# Patient Record
Sex: FEMALE
[De-identification: names, ages, dates, MRNs, and addresses within clinical notes are randomized per-mention and may not be internally consistent; named-entity substitution may affect disease eponyms.]

---

## 2018-06-22 ENCOUNTER — HOSPITAL ENCOUNTER (OUTPATIENT)
Dept: HOSPITAL 88 - OR | Age: 28
Discharge: HOME | End: 2018-06-22
Attending: INTERNAL MEDICINE
Payer: COMMERCIAL

## 2018-06-22 DIAGNOSIS — K51.50: ICD-10-CM

## 2018-06-22 DIAGNOSIS — K20.9: ICD-10-CM

## 2018-06-22 DIAGNOSIS — K92.0: Primary | ICD-10-CM

## 2018-06-22 DIAGNOSIS — K44.9: ICD-10-CM

## 2018-06-22 DIAGNOSIS — K29.70: ICD-10-CM

## 2018-06-22 LAB — LACTOFERRIN STL QL: NEGATIVE

## 2018-06-22 PROCEDURE — 87493 C DIFF AMPLIFIED PROBE: CPT

## 2018-06-22 PROCEDURE — 87328 CRYPTOSPORIDIUM AG IA: CPT

## 2018-06-22 PROCEDURE — 83993 ASSAY FOR CALPROTECTIN FECAL: CPT

## 2018-06-22 PROCEDURE — 81025 URINE PREGNANCY TEST: CPT

## 2018-06-22 PROCEDURE — 87045 FECES CULTURE AEROBIC BACT: CPT

## 2018-06-22 PROCEDURE — 87177 OVA AND PARASITES SMEARS: CPT

## 2018-06-22 PROCEDURE — 43239 EGD BIOPSY SINGLE/MULTIPLE: CPT

## 2018-06-22 PROCEDURE — 83630 LACTOFERRIN FECAL (QUAL): CPT

## 2018-06-22 PROCEDURE — 45380 COLONOSCOPY AND BIOPSY: CPT

## 2018-06-22 NOTE — OPERATIVE REPORT
DATE OF PROCEDURE:  June 22, 2018 



REFERRING PHYSICIAN:  Dr. Jean Claude James 



PROCEDURE PERFORMED:  Esophagogastroduodenoscopy with biopsies and a 

colonoscopy with biopsies.



INDICATIONS FOR ESOPHAGOGASTRODUODENOSCOPY:  Upper abdominal pain, 

bloating, history of coffee-ground emesis.



INDICATIONS FOR COLONOSCOPY:  Bloody stools.



MEDICATION:  Patient was done under MAC.  Please see anesthesiologist's 

note.



PROCEDURE:  With patient in left lateral decubitus position, flexible 

fiberoptic Olympus gastroscope was introduced into the esophagus under 

direct visualization without any difficulty.  There was some patchy 

erythema noted in distal esophagus.  The scope was then advanced with ease 

into the stomach traversing a small sliding hiatal hernia.  Mucosa 

overlying the antrum and the body revealed some diffuse erythema and 

low-grade to moderate edema and biopsies were obtained and sent to stain 

for H. pylori.  The pylorus was of normal contour and shape, was intubated 

with ease and scope was advanced all the way to the 2nd portion of the 

duodenum.  Biopsies were obtained from the proximal 2nd portion to rule out 

sprue.  The mucosa overlying the duodenal bulb appeared to be within normal 

limits.  The scope was then withdrawn back into the stomach and retroflexed 

and mucosa overlying the fundus and the cardia appeared to be within normal 

limits.  The scope was then straightened out.  Stomach was decompressed.  

The scope was subsequently withdrawn.  Patient tolerated procedure well.



IMPRESSION  

1. Distal esophagitis, mild.

2. Small sliding hiatal hernia.

3. Gastritis biopsied.  Biopsy sent to stain for H. pylori.

4. Rule out sprue.



PLAN:  Follow up histology.  Initiate Protonix 40 mg 1 p.o. q. a.m. a.c.



Patient was then turned around and after adequate lubrication of the anal 

canal a flexible fiberoptic Olympus colonoscope was inserted into the 

rectum with ease and advanced all the way to the cecum.  Mucosa overlying 

the cecum appeared to be within normal limits.  The ileocecal valve was 

intubated, and the scope was advanced into the terminal ileum.  Biopsies 

were obtained.  The scope was then withdrawn back into the colon.  It was 

then withdrawn slowly and  the mucosa overlying the ascending and the 

transverse grossly appeared to be within normal limits.  There was some 

patchy mild inflammatory changes noted in the left colon and multiple 

random biopsies were obtained.  The scope was retroflexed into the distal 

rectum and area around the dentate line appeared to be within normal 

limits.  The scope was then straightened out, and was subsequently 

withdrawn after securing an adequate stool specimen that was sent for the 

appropriate stool studies.  Patient tolerated the procedure well.



IMPRESSION:  Mild patchy left-sided colitis.



PLAN:  Follow up histology.  Follow up stool studies.  Initiate Bentyl 10 

mg one p.o. t.i.d. and VSL #3 one p.o. daily. 



  



DD:  06/22/2018 17:16

DT:  06/22/2018 17:21

Job#:  [id number]



cc:MONY JAMES DO

## 2018-06-23 LAB — C DIFFICILE TOXIN A&B AMP PROB: NEGATIVE

## 2018-07-09 ENCOUNTER — HOSPITAL ENCOUNTER (EMERGENCY)
Dept: HOSPITAL 88 - ER | Age: 28
Discharge: HOME | End: 2018-07-09
Payer: COMMERCIAL

## 2018-07-09 VITALS — BODY MASS INDEX: 27.47 KG/M2 | WEIGHT: 175 LBS | HEIGHT: 67 IN

## 2018-07-09 DIAGNOSIS — K29.00: ICD-10-CM

## 2018-07-09 DIAGNOSIS — R10.84: Primary | ICD-10-CM

## 2018-07-09 DIAGNOSIS — R11.2: ICD-10-CM

## 2018-07-09 LAB
ALBUMIN SERPL-MCNC: 4 G/DL (ref 3.5–5)
ALBUMIN/GLOB SERPL: 1.3 {RATIO} (ref 0.8–2)
ALP SERPL-CCNC: 53 IU/L (ref 40–150)
ALT SERPL-CCNC: 22 IU/L (ref 0–55)
AMYLASE SERPL-CCNC: 52 U/L (ref 25–125)
ANION GAP SERPL CALC-SCNC: 11 MMOL/L (ref 8–16)
BACTERIA URNS QL MICRO: (no result) /HPF
BASOPHILS # BLD AUTO: 0 10*3/UL (ref 0–0.1)
BASOPHILS NFR BLD AUTO: 1 % (ref 0–1)
BILIRUB UR QL: NEGATIVE
BUN SERPL-MCNC: 12 MG/DL (ref 7–26)
BUN/CREAT SERPL: 14 (ref 6–25)
CALCIUM SERPL-MCNC: 8.7 MG/DL (ref 8.4–10.2)
CHLORIDE SERPL-SCNC: 106 MMOL/L (ref 98–107)
CLARITY UR: (no result)
CO2 SERPL-SCNC: 26 MMOL/L (ref 22–29)
COLOR UR: YELLOW
DEPRECATED APTT PLAS QN: 27.5 SECONDS (ref 23.8–35.5)
DEPRECATED INR PLAS: 1.05
DEPRECATED NEUTROPHILS # BLD AUTO: 2.3 10*3/UL (ref 2.1–6.9)
DEPRECATED RBC URNS MANUAL-ACNC: (no result) /HPF (ref 0–5)
EGFRCR SERPLBLD CKD-EPI 2021: > 60 ML/MIN (ref 60–?)
EOSINOPHIL # BLD AUTO: 0 10*3/UL (ref 0–0.4)
EOSINOPHIL NFR BLD AUTO: 1 % (ref 0–6)
EPI CELLS URNS QL MICRO: (no result) /LPF
ERYTHROCYTE [DISTWIDTH] IN CORD BLOOD: 12.4 % (ref 11.7–14.4)
GLOBULIN PLAS-MCNC: 3 G/DL (ref 2.3–3.5)
GLUCOSE SERPLBLD-MCNC: 99 MG/DL (ref 74–118)
HCG UR QL: NEGATIVE
HCT VFR BLD AUTO: 38.6 % (ref 34.2–44.1)
HGB BLD-MCNC: 13.8 G/DL (ref 12–16)
KETONES UR QL STRIP.AUTO: NEGATIVE
LEUKOCYTE ESTERASE UR QL STRIP.AUTO: (no result)
LIPASE SERPL-CCNC: 23 U/L (ref 8–78)
LYMPHOCYTES # BLD: 1.4 10*3/UL (ref 1–3.2)
LYMPHOCYTES NFR BLD AUTO: 34.6 % (ref 18–39.1)
MCH RBC QN AUTO: 32 PG (ref 28–32)
MCHC RBC AUTO-ENTMCNC: 35.8 G/DL (ref 31–35)
MCV RBC AUTO: 89.6 FL (ref 81–99)
MONOCYTES # BLD AUTO: 0.3 10*3/UL (ref 0.2–0.8)
MONOCYTES NFR BLD AUTO: 6.3 % (ref 4.4–11.3)
NEUTS SEG NFR BLD AUTO: 56.8 % (ref 38.7–80)
NITRITE UR QL STRIP.AUTO: NEGATIVE
PLATELET # BLD AUTO: 177 X10E3/UL (ref 140–360)
POTASSIUM SERPL-SCNC: 4 MMOL/L (ref 3.5–5.1)
PROT UR QL STRIP.AUTO: NEGATIVE
PROTHROMBIN TIME: 12.9 SECONDS (ref 11.9–14.5)
RBC # BLD AUTO: 4.31 X10E6/UL (ref 3.6–5.1)
SODIUM SERPL-SCNC: 139 MMOL/L (ref 136–145)
SP GR UR STRIP: 1.01 (ref 1.01–1.02)
UROBILINOGEN UR STRIP-MCNC: 0.2 MG/DL (ref 0.2–1)
WBC #/AREA URNS HPF: (no result) /HPF (ref 0–5)

## 2018-07-09 PROCEDURE — 86900 BLOOD TYPING SEROLOGIC ABO: CPT

## 2018-07-09 PROCEDURE — 85025 COMPLETE CBC W/AUTO DIFF WBC: CPT

## 2018-07-09 PROCEDURE — 81025 URINE PREGNANCY TEST: CPT

## 2018-07-09 PROCEDURE — 82150 ASSAY OF AMYLASE: CPT

## 2018-07-09 PROCEDURE — 74177 CT ABD & PELVIS W/CONTRAST: CPT

## 2018-07-09 PROCEDURE — 85730 THROMBOPLASTIN TIME PARTIAL: CPT

## 2018-07-09 PROCEDURE — 85610 PROTHROMBIN TIME: CPT

## 2018-07-09 PROCEDURE — 36415 COLL VENOUS BLD VENIPUNCTURE: CPT

## 2018-07-09 PROCEDURE — 80053 COMPREHEN METABOLIC PANEL: CPT

## 2018-07-09 PROCEDURE — 86850 RBC ANTIBODY SCREEN: CPT

## 2018-07-09 PROCEDURE — 83690 ASSAY OF LIPASE: CPT

## 2018-07-09 PROCEDURE — 81001 URINALYSIS AUTO W/SCOPE: CPT

## 2018-07-09 PROCEDURE — 99284 EMERGENCY DEPT VISIT MOD MDM: CPT

## 2018-07-09 NOTE — DIAGNOSTIC IMAGING REPORT
EXAM: CT Abdomen and Pelvis WITH contrast  

INDICATION:      

\S\abdominal pain, vomiting, bloody stool

\[id number]

\S\1230

\S\Y 

COMPARISON: None.

TECHNIQUE: Abdomen and pelvis were scanned utilizing a multidetector helical

scanner from the lung base to the pubic symphysis after administration of IV

contrast. Coronal and sagittal reformations were obtained. Routine protocol was

performed. Scan was performed when during portal venous phase.    

     IV CONTRAST: 100 mL of Isovue-370

     ORAL CONTRAST: Gastroview

            

COMPLICATIONS: None



RADIATION DOSE:

     Total DLP: 481.62 mGy*cm

     Estimated effective dose: (DLP x 0.015 x size factor) mSv

     CTDIvol has been reviewed. It is below the limits set by the Radiation

Protocol Committee (RPC).



FINDINGS:



LINES and TUBES: None.



LOWER THORAX:  Unremarkable



HEPATOBILIARY:      Hyperenhancing 1 cm right hepatic lobe lesion (series 2,

image 15). No biliary ductal dilation. 



GALLBLADDER: No radio-opaque stones or sludge.  No wall thickening.



SPLEEN: No splenomegaly. 



PANCREAS: No focal masses or ductal dilatation.  



ADRENALS: No adrenal nodules    



KIDNEYS/URETERS: Kidneys enhance symmetrically.  No hydronephrosis. No cystic

or solid mass lesions.  No stones.



GI TRACT: No abnormal distention, wall thickening, or evidence of bowel

obstruction.       Appendix is normal.



PELVIC ORGANS/BLADDER: IUD in place. Bladder is unremarkable.



LYMPH NODES: No lymphadenopathy.



VESSELS: Unremarkable.



PERITONEUM / RETROPERITONEUM: No free air or fluid.



BONES: Unremarkable.



SOFT TISSUES: Unremarkable.            



IMPRESSION: 

1.  No acute inflammatory process in the abdomen/pelvis.

2.  1 cm enhancing right hepatic lobe lesion cannot be characterized on this

single phase study and is probably a flash filling hemangioma.

3.  Intrauterine device in place.



Signed by: Dr. Ash Hollis MD on 7/9/2018 1:17 PM

## 2018-07-11 ENCOUNTER — HOSPITAL ENCOUNTER (OUTPATIENT)
Dept: HOSPITAL 88 - NM | Age: 28
End: 2018-07-11
Attending: INTERNAL MEDICINE
Payer: COMMERCIAL

## 2018-07-11 DIAGNOSIS — R10.10: Primary | ICD-10-CM

## 2018-07-11 PROCEDURE — 78227 HEPATOBIL SYST IMAGE W/DRUG: CPT

## 2018-07-11 NOTE — DIAGNOSTIC IMAGING REPORT
Hepatobiliary Scan with Gallbladder Ejection Fraction



Clinical information: 28 F with abdominal pain



Report: Following intravenous administration of 6.8 millicuries of Tc-99m

mebrofenin, dynamic images of the abdomen in the anterior projection were

obtained through 30 minutes.  Sincalide (CCK analog) 1.6 micrograms was

administered intravenously over 30 minutes with additional imaging for

determination of gallbladder ejection fraction.



Perfusion to the liver is normal.  Extraction of tracer from the blood pool by

the liver parenchyma is normal.  Tracer is seen promptly within the biliary

tract.  The gallbladder begins to fill by 10 minutes post-injection of tracer

and fills adequately.  Tracer is seen in the small bowel during the sincalide

infusion.  The gallbladder ejection fraction with administration of sincalide

is 79% (normal greater than 40%).



Impression: 



1.  Filling of the gallbladder excludes the diagnosis of acute cystic duct

obstruction/acute cholecystitis.

2.  Normal gallbladder ejection fraction of 79% does not support the clinical

diagnosis of chronic cholecystitis/gallbladder dyskinesia.



Signed by: Dr. Gabrielle Parsons M.D. on 7/11/2018 5:12 PM